# Patient Record
Sex: MALE | Race: WHITE | Employment: STUDENT | URBAN - METROPOLITAN AREA
[De-identification: names, ages, dates, MRNs, and addresses within clinical notes are randomized per-mention and may not be internally consistent; named-entity substitution may affect disease eponyms.]

---

## 2017-11-27 ENCOUNTER — OFFICE VISIT (OUTPATIENT)
Dept: FAMILY MEDICINE | Facility: CLINIC | Age: 27
End: 2017-11-27
Payer: COMMERCIAL

## 2017-11-27 VITALS
DIASTOLIC BLOOD PRESSURE: 83 MMHG | HEART RATE: 69 BPM | BODY MASS INDEX: 24.56 KG/M2 | SYSTOLIC BLOOD PRESSURE: 131 MMHG | HEIGHT: 77 IN | WEIGHT: 208 LBS | TEMPERATURE: 97.5 F

## 2017-11-27 DIAGNOSIS — Z00.00 ROUTINE HISTORY AND PHYSICAL EXAMINATION OF ADULT: Primary | ICD-10-CM

## 2017-11-27 PROCEDURE — 99395 PREV VISIT EST AGE 18-39: CPT | Performed by: FAMILY MEDICINE

## 2017-11-27 NOTE — PROGRESS NOTES
SUBJECTIVE:   CC: Asael Pena is an 27 year old male who presents for preventative health visit.     Healthy Habits:    Do you get at least three servings of calcium containing foods daily (dairy, green leafy vegetables, etc.)? 2 servings on average.    Amount of exercise or daily activities, outside of work: None    Problems taking medications regularly not applicable    Medication side effects: No    Have you had an eye exam in the past two years? Yes, Summer of 2015.    Do you see a dentist twice per year? yes    Do you have sleep apnea, excessive snoring or daytime drowsiness?no      Family Hx: none    No current outpatient prescriptions on file.    There is no problem list on file for this patient.    He has had a hemorrhoid 10 months ago. This was banded.     Chief Complaint   Patient presents with     Physical     General physical exam.     Lipids     Discuss about having labs done.       Today's PHQ-2 Score:   PHQ-2 ( 1999 Pfizer) 11/27/2017   Q1: Little interest or pleasure in doing things 0   Q2: Feeling down, depressed or hopeless 0   PHQ-2 Score 0       Abuse: Current or Past(Physical, Sexual or Emotional)- No  Do you feel safe in your environment - Yes    Social History   Substance Use Topics     Smoking status: Never Smoker     Smokeless tobacco: Never Used     Alcohol use No      Comment: None in the past 7 months.     The patient does not drink >3 drinks per day nor >7 drinks per week.    Last PSA: No results found for: PSA    Reviewed orders with patient. Reviewed health maintenance and updated orders accordingly - Yes  Reviewed and updated as needed this visit by clinical staff  Tobacco  Allergies  Meds  Med Hx  Surg Hx  Fam Hx  Soc Hx      Reviewed and updated as needed this visit by Provider    ROS:  C: NEGATIVE for fever, chills, change in weight  I: NEGATIVE for worrisome rashes, moles or lesions  E: NEGATIVE for vision changes or irritation  ENT: NEGATIVE for ear, mouth and throat  "problems  R: NEGATIVE for significant cough or SOB  CV: NEGATIVE for chest pain, palpitations or peripheral edema  GI: NEGATIVE for nausea, abdominal pain, heartburn, or change in bowel habits   male: negative for dysuria, hematuria, decreased urinary stream, erectile dysfunction, urethral discharge  M: NEGATIVE for significant arthralgias or myalgia  N: NEGATIVE for weakness, dizziness or paresthesias  P: NEGATIVE for changes in mood or affect    OBJECTIVE:   /83  Pulse 69  Temp 97.5  F (36.4  C) (Tympanic)  Ht 6' 4.5\" (1.943 m)  Wt 208 lb (94.3 kg)  BMI 24.99 kg/m2  EXAM:  GENERAL APPEARANCE: healthy, alert and no distress  EYES: EOMI,  PERRL  HENT: ear canals and TM's normal and nose and mouth without ulcers or lesions  NECK: no adenopathy, no asymmetry, masses, or scars and thyroid normal to palpation  RESP: lungs clear to auscultation - no rales, rhonchi or wheezes  CV: regular rates and rhythm, normal S1 S2, no S3 or S4 and no murmur, click or rub -  ABDOMEN:  soft, nontender, no HSM or masses and bowel sounds normal  GU_male: testicles normal without atrophy or masses, no hernias and penis normal without urethral discharge  MS: extremities normal- no gross deformities noted, no evidence of inflammation in joints, FROM in all extremities.  MS: mild bunions noted.   SKIN: no suspicious lesions or rashes  NEURO: Normal strength and tone, sensory exam grossly normal, mentation intact and speech normal  PSYCH: mentation appears normal and affect normal/bright  LYMPHATICS: No axillary, cervical, inguinal, or supraclavicular nodes      ASSESSMENT/PLAN:   1. Routine history and physical examination of adult  COUNSELING:  Reviewed preventive health counseling, as reflected in patient instructions       Regular exercise       Healthy diet/nutrition       Vision screening       Hearing screening   reports that he has never smoked. He has never used smokeless tobacco.  Estimated body mass index is 24.99 " "kg/(m^2) as calculated from the following:    Height as of this encounter: 6' 4.5\" (1.943 m).    Weight as of this encounter: 208 lb (94.3 kg).   Counseling Resources:  ATP IV Guidelines  Pooled Cohorts Equation Calculator  FRAX Risk Assessment  ICSI Preventive Guidelines  Dietary Guidelines for Americans, 2010  USDA's MyPlate  ASA Prophylaxis  Lung CA ScreeningMonitor and record the BP at rest and when healthy. The goal for the average is under 130/80.     Joon Grijalva MD  [facility]  "

## 2017-11-27 NOTE — PATIENT INSTRUCTIONS
Preventive Health Recommendations  Male Ages 18 - 25     Yearly exam:             See your health care provider every year in order to  o   Review health changes.   o   Discuss preventive care.    o   Review your medicines if your doctor has prescribed any.    You should be tested each year for STDs (sexually transmitted diseases).     Talk to your provider about cholesterol testing.      If you are at risk for diabetes, you should have a diabetes test (fasting glucose).    Shots: Get a flu shot each year. Get a tetanus shot every 10 years.     Nutrition:    Eat at least 5 servings of fruits and vegetables daily.     Eat whole-grain bread, whole-wheat pasta and brown rice instead of white grains and rice.     Talk to your provider about calcium and Vitamin D.     Lifestyle    Exercise for at least 150 minutes a week (30 minutes a day, 5 days a week). This will help you control your weight and prevent disease.     Limit alcohol to one drink per day.     No smoking.     Wear sunscreen to prevent skin cancer.     See your dentist every six months for an exam and cleaning.             Thank you for choosing [facility]  You may be receiving a survey in the mail from [employer] regarding your visit today.  Please take a few minutes to complete and return the survey to let us know how we are doing.      If you have questions or concerns, please contact us via [employer] or you can contact your care team at [phone].    Our Clinic hours are:  Monday 6:40 am  to 7:00 pm  Tuesday -Friday 6:40 am to 5:00 pm    The [place] outpatient lab hours are:  Monday - Friday 6:10 am to 4:45 pm  Saturdays 7:00 am to 11:00 am  Appointments are required, call [phone]    If you have clinical questions after hours or would like to schedule an appointment,  call the clinic at [phone].    ASSESSMENT/PLAN:   1. Routine history and physical examination of adult  COUNSELING:  Reviewed preventive health counseling, as reflected  "in patient instructions       Regular exercise       Healthy diet/nutrition       Vision screening       Hearing screening   reports that he has never smoked. He has never used smokeless tobacco.  Estimated body mass index is 24.99 kg/(m^2) as calculated from the following:    Height as of this encounter: 6' 4.5\" (1.943 m).    Weight as of this encounter: 208 lb (94.3 kg).   Counseling Resources:  ATP IV Guidelines  Pooled Cohorts Equation Calculator  FRAX Risk Assessment  ICSI Preventive Guidelines  Dietary Guidelines for Americans, 2010  USDA's MyPlate  ASA Prophylaxis  Lung CA ScreeningMonitor and record the BP at rest and when healthy. The goal for the average is under 130/80.   "

## 2017-11-27 NOTE — NURSING NOTE
"Chief Complaint   Patient presents with     Physical     General physical exam.     Lipids     Discuss about having labs done.       Initial /83  Pulse 69  Temp 97.5  F (36.4  C) (Tympanic)  Ht 6' 4.5\" (1.943 m)  Wt 208 lb (94.3 kg)  BMI 24.99 kg/m2 Estimated body mass index is 24.99 kg/(m^2) as calculated from the following:    Height as of this encounter: 6' 4.5\" (1.943 m).    Weight as of this encounter: 208 lb (94.3 kg).  Medication Reconciliation: complete  "

## 2019-01-17 ENCOUNTER — ANCILLARY PROCEDURE (OUTPATIENT)
Dept: GENERAL RADIOLOGY | Facility: CLINIC | Age: 29
End: 2019-01-17
Payer: COMMERCIAL

## 2019-01-17 ENCOUNTER — OFFICE VISIT (OUTPATIENT)
Dept: FAMILY MEDICINE | Facility: CLINIC | Age: 29
End: 2019-01-17
Payer: COMMERCIAL

## 2019-01-17 VITALS
DIASTOLIC BLOOD PRESSURE: 82 MMHG | OXYGEN SATURATION: 99 % | BODY MASS INDEX: 24.48 KG/M2 | RESPIRATION RATE: 16 BRPM | HEART RATE: 70 BPM | TEMPERATURE: 97.4 F | HEIGHT: 76 IN | WEIGHT: 201 LBS | SYSTOLIC BLOOD PRESSURE: 134 MMHG

## 2019-01-17 DIAGNOSIS — Z00.00 ROUTINE HISTORY AND PHYSICAL EXAMINATION OF ADULT: Primary | ICD-10-CM

## 2019-01-17 DIAGNOSIS — M25.531 RIGHT WRIST PAIN: ICD-10-CM

## 2019-01-17 DIAGNOSIS — M25.561 CHRONIC PAIN OF RIGHT KNEE: ICD-10-CM

## 2019-01-17 DIAGNOSIS — M79.641 PAIN OF RIGHT HAND: ICD-10-CM

## 2019-01-17 DIAGNOSIS — K64.8 INTERNAL HEMORRHOID: ICD-10-CM

## 2019-01-17 DIAGNOSIS — G89.29 CHRONIC PAIN OF RIGHT KNEE: ICD-10-CM

## 2019-01-17 PROCEDURE — 73130 X-RAY EXAM OF HAND: CPT | Mod: RT

## 2019-01-17 PROCEDURE — 99213 OFFICE O/P EST LOW 20 MIN: CPT | Mod: 25 | Performed by: FAMILY MEDICINE

## 2019-01-17 PROCEDURE — 73110 X-RAY EXAM OF WRIST: CPT | Mod: RT

## 2019-01-17 PROCEDURE — 99395 PREV VISIT EST AGE 18-39: CPT | Performed by: FAMILY MEDICINE

## 2019-01-17 RX ORDER — NYSTATIN 100000/ML
500000 SUSPENSION, ORAL (FINAL DOSE FORM) ORAL 4 TIMES DAILY
Qty: 200 ML | Refills: 0 | COMMUNITY
Start: 2019-01-17

## 2019-01-17 ASSESSMENT — MIFFLIN-ST. JEOR: SCORE: 1997.2

## 2019-01-17 NOTE — PATIENT INSTRUCTIONS
Preventive Health Recommendations  Male Ages 26 - 39    Yearly exam:             See your health care provider every year in order to  o   Review health changes.   o   Discuss preventive care.    o   Review your medicines if your doctor has prescribed any.    You should be tested each year for STDs (sexually transmitted diseases), if you re at risk.     After age 35, talk to your provider about cholesterol testing. If you are at risk for heart disease, have your cholesterol tested at least every 5 years.     If you are at risk for diabetes, you should have a diabetes test (fasting glucose).  Shots: Get a flu shot each year. Get a tetanus shot every 10 years.     Nutrition:    Eat at least 5 servings of fruits and vegetables daily.     Eat whole-grain bread, whole-wheat pasta and brown rice instead of white grains and rice.     Get adequate Calcium and Vitamin D.     Lifestyle    Exercise for at least 150 minutes a week (30 minutes a day, 5 days a week). This will help you control your weight and prevent disease.     Limit alcohol to one drink per day.     No smoking.     Wear sunscreen to prevent skin cancer.     See your dentist every six months for an exam and cleaning.             Thank you for choosing [facility].  You may be receiving a survey in the mail from [employer] regarding your visit today.  Please take a few minutes to complete and return the survey to let us know how we are doing.      If you have questions or concerns, please contact us via [employer] or you can contact your care team at [phone].    Our Clinic hours are:  Monday 6:40 am  to 7:00 pm  Tuesday -Friday 6:40 am to 5:00 pm    The [place] outpatient lab hours are:  Monday - Friday 6:10 am to 4:45 pm  Saturdays 7:00 am to 11:00 am  Appointments are required, call [phone]    If you have clinical questions after hours or would like to schedule an appointment,  call the clinic at [phone].    ASSESSMENT/PLAN:   1. Routine  "history and physical examination of adult  COUNSELING:  Reviewed preventive health counseling, as reflected in patient instructions       Regular exercise       Healthy diet/nutrition       Vision screening       Hearing screening    BP Readings from Last 1 Encounters:   01/17/19 134/82     Estimated body mass index is 24.31 kg/m  as calculated from the following:    Height as of this encounter: 1.937 m (6' 4.25\").    Weight as of this encounter: 91.2 kg (201 lb).     reports that  has never smoked. he has never used smokeless tobacco.  Counseling Resources:  ATP IV Guidelines  Pooled Cohorts Equation Calculator  FRAX Risk Assessment  ICSI Preventive Guidelines  Dietary Guidelines for Americans, 2010  [employer]'s MyPlate  ASA Prophylaxis  Lung CA Screening    2. Internal hemorrhoid  For the hemorrhoid call [phone] for the surgery appt and they may do banding in the clinic. Ronal LEDESMA   - GENERAL SURG ADULT REFERRAL    3. Right wrist pain  For the right wrist and index finger MCP joints we will do the x-rays today and call the results.   Modify activities and avoid repetitive motions with no break. Use ice for 5-10 minutes three times daily for a few days.   Advil at 400-600 mg per dose, with food up to 3 times daily could be used as needed. If not better then you see Dr. Marin for this in Ortho.   - XR Wrist Right G/E 3 Views; Future    4. Pain of right hand  See above.   - XR Hand Right G/E 3 Views; Future    5. Chronic pain of right knee  For the right knee there could be a Baker's, or ganglion cyst. Kneeling and squatting could worsen it.   If the symptoms continue call our clinic RN at [phone] and I will order the MRI without contrast.       "

## 2019-01-17 NOTE — PROGRESS NOTES
SUBJECTIVE:   CC: Asael Pena is an 28 year old male who presents for preventive health visit.     Healthy Habits:    Do you get at least three servings of calcium containing foods daily (dairy, green leafy vegetables, etc.)? yes    Amount of exercise or daily activities, outside of work: None    Problems taking medications regularly No    Medication side effects: No    Have you had an eye exam in the past two years? yes    Do you see a dentist twice per year? yes    Do you have sleep apnea, excessive snoring or daytime drowsiness?no    Chief Complaint   Patient presents with     Physical     General physical exam.       Current Outpatient Medications:      nystatin (MYCOSTATIN) 495942 UNIT/ML suspension, Take 5 mLs (500,000 Units) by mouth 4 times daily, Disp: 200 mL, Rfl: 0    There is no problem list on file for this patient.    Today's PHQ-2 Score:   PHQ-2 ( 1999 Pfizer) 1/17/2019 11/27/2017   Q1: Little interest or pleasure in doing things 0 0   Q2: Feeling down, depressed or hopeless 0 0   PHQ-2 Score 0 0       Abuse: Current or Past(Physical, Sexual or Emotional)- No  Do you feel safe in your environment? Yes    Social History     Tobacco Use     Smoking status: Never Smoker     Smokeless tobacco: Never Used   Substance Use Topics     Alcohol use: No     Comment: Occ use.     If you drink alcohol do you typically have >3 drinks per day or >7 drinks per week? No                      Last PSA: No results found for: PSA    Reviewed orders with patient. Reviewed health maintenance and updated orders accordingly - Yes    Reviewed and updated as needed this visit by clinical staff  Tobacco  Allergies  Meds  Med Hx  Surg Hx  Fam Hx  Soc Hx      Reviewed and updated as needed this visit by Provider    ROS:  CONSTITUTIONAL: NEGATIVE for fever, chills, change in weight  INTEGUMENTARY/SKIN: NEGATIVE for worrisome rashes, moles or lesions  EYES: NEGATIVE for vision changes or irritation  ENT: NEGATIVE for ear, mouth  "and throat problems  RESP: NEGATIVE for significant cough or SOB  CV: NEGATIVE for chest pain, palpitations or peripheral edema  GI: NEGATIVE for nausea, abdominal pain, heartburn, or change in bowel habits   male: negative for dysuria, hematuria, decreased urinary stream, erectile dysfunction, urethral discharge  MUSCULOSKELETAL: NEGATIVE for significant arthralgias or myalgia  NEURO: NEGATIVE for weakness, dizziness or paresthesias  PSYCHIATRIC: NEGATIVE for changes in mood or affect    He had an internal hemorrhoid that was banded in 2016. He has symptoms now with straining.     OBJECTIVE:   /82   Pulse 70   Temp 97.4  F (36.3  C) (Tympanic)   Resp 16   Ht 1.937 m (6' 4.25\")   Wt 91.2 kg (201 lb)   SpO2 99%   BMI 24.31 kg/m    EXAM:  GENERAL APPEARANCE: healthy, alert and no distress  EYES: EOMI,  PERRL  HENT: ear canals and TM's normal and nose and mouth without ulcers or lesions  NECK: no adenopathy, no asymmetry, masses, or scars and thyroid normal to palpation  RESP: lungs clear to auscultation - no rales, rhonchi or wheezes  CV: regular rates and rhythm, normal S1 S2, no S3 or S4 and no murmur, click or rub -  ABDOMEN:  soft, nontender, no HSM or masses and bowel sounds normal  GU_male: testicles normal without atrophy or masses, no hernias and penis normal without urethral discharge  MS: extremities normal- no gross deformities noted, no evidence of inflammation in joints, FROM in all extremities.  MS: bilateral bunions, more on the left.   SKIN: no suspicious lesions or rashes  NEURO: Normal strength and tone, sensory exam grossly normal, mentation intact and speech normal  PSYCH: mentation appears normal and affect normal/bright  LYMPHATICS: No axillary, cervical, inguinal, or supraclavicular nodes      ASSESSMENT/PLAN:   1. Routine history and physical examination of adult  COUNSELING:  Reviewed preventive health counseling, as reflected in patient instructions       Regular exercise       " "Healthy diet/nutrition       Vision screening       Hearing screening    BP Readings from Last 1 Encounters:   01/17/19 134/82     Estimated body mass index is 24.31 kg/m  as calculated from the following:    Height as of this encounter: 1.937 m (6' 4.25\").    Weight as of this encounter: 91.2 kg (201 lb).     reports that  has never smoked. he has never used smokeless tobacco.  Counseling Resources:  ATP IV Guidelines  Pooled Cohorts Equation Calculator  FRAX Risk Assessment  ICSI Preventive Guidelines  Dietary Guidelines for Americans, 2010  USDA's MyPlate  ASA Prophylaxis  Lung CA Screening    2. Internal hemorrhoid  For the hemorrhoid call [phone] for the surgery appt and they may do banding in the clinic. Ronal LEDESMA   - GENERAL SURG ADULT REFERRAL    3. Right wrist pain  For the right wrist and index finger MCP joints we will do the x-rays today and call the results.   Modify activities and avoid repetitive motions with no break. Use ice for 5-10 minutes three times daily for a few days.   Advil at 400-600 mg per dose, with food up to 3 times daily could be used as needed. If not better then you see Dr. Marin for this in Ortho.   - XR Wrist Right G/E 3 Views; Future    4. Pain of right hand  See above.   - XR Hand Right G/E 3 Views; Future    5. Chronic pain of right knee  For the right knee there could be a Baker's, or ganglion cyst. Kneeling and squatting could worsen it.   If the symptoms continue call our clinic RN at [phone] and I will order the MRI without contrast.       Joon Grijalva MD  [facility]  "

## 2019-02-25 ENCOUNTER — MYC MEDICAL ADVICE (OUTPATIENT)
Dept: FAMILY MEDICINE | Facility: CLINIC | Age: 29
End: 2019-02-25

## 2019-02-25 DIAGNOSIS — Q38.3 TONGUE ABNORMALITY: Primary | ICD-10-CM

## 2019-02-27 ENCOUNTER — MYC MEDICAL ADVICE (OUTPATIENT)
Dept: FAMILY MEDICINE | Facility: CLINIC | Age: 29
End: 2019-02-27

## 2019-02-27 DIAGNOSIS — Q38.3 TONGUE ABNORMALITY: Primary | ICD-10-CM

## 2019-02-27 NOTE — TELEPHONE ENCOUNTER
I make a referral to ENT for evaluation. Using an antibiotic for something unknown is not recommended. If there is an infection then he could be passing it back and forth and he has his girlfriend may need to be treated at the same time. Have him call [phone] for the ENT appt.   Thanks. Joon Grijalva

## 2019-06-10 ENCOUNTER — OFFICE VISIT (OUTPATIENT)
Dept: ORTHOPEDICS | Facility: CLINIC | Age: 29
End: 2019-06-10
Payer: COMMERCIAL

## 2019-06-10 ENCOUNTER — ANCILLARY PROCEDURE (OUTPATIENT)
Dept: GENERAL RADIOLOGY | Facility: CLINIC | Age: 29
End: 2019-06-10
Attending: PEDIATRICS
Payer: COMMERCIAL

## 2019-06-10 ENCOUNTER — TELEPHONE (OUTPATIENT)
Dept: ORTHOPEDICS | Facility: CLINIC | Age: 29
End: 2019-06-10

## 2019-06-10 VITALS
BODY MASS INDEX: 24.72 KG/M2 | HEIGHT: 76 IN | DIASTOLIC BLOOD PRESSURE: 80 MMHG | SYSTOLIC BLOOD PRESSURE: 135 MMHG | WEIGHT: 203 LBS

## 2019-06-10 DIAGNOSIS — S92.335A CLOSED NONDISPLACED FRACTURE OF THIRD METATARSAL BONE OF LEFT FOOT, INITIAL ENCOUNTER: ICD-10-CM

## 2019-06-10 DIAGNOSIS — M79.672 LEFT FOOT PAIN: ICD-10-CM

## 2019-06-10 DIAGNOSIS — M79.672 LEFT FOOT PAIN: Primary | ICD-10-CM

## 2019-06-10 PROCEDURE — 99203 OFFICE O/P NEW LOW 30 MIN: CPT | Performed by: PEDIATRICS

## 2019-06-10 PROCEDURE — 73630 X-RAY EXAM OF FOOT: CPT | Mod: LT | Performed by: PEDIATRICS

## 2019-06-10 ASSESSMENT — MIFFLIN-ST. JEOR: SCORE: 1997.3

## 2019-06-10 NOTE — PROGRESS NOTES
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    Injury: unknown, moved into new apartment one week ago, possibly dropped something on his foot. Though does not recall that.  **  No pain leading up to this time.  No pain on day of moving in. Pain 1-2 days later.  Worked 6 days this past week. Continued to have pain. Also noted swelling early last week.  Swelling forefoot dorsally.  Pain with toeing off, walking. Pain with stairs. Most of time pain with walking.    Does have hx of bunion on left, not new.    Location of Pain: left four MTP joints  Duration of Pain: 1 week(s)  Rating of Pain at worst: 6/10  Rating of Pain Currently: 5/10  Symptoms are better with: Ice, Ibuprofen, Rest, Elevation and compression  Symptoms are worse with: standing, stairs and walking.  Additional Features:   Positive: swelling   Negative: bruising, popping, grinding, catching, locking, instability, paresthesias, numbness, weakness, pain in other joints and systemic symptoms  Other evaluation and/or treatments so far consists of: Ice, Ibuprofen, Rest, Elevation and compression  Prior History of related problems: None, has a bunion on left big toe.    Social History: pharmacy tech at [employer]    Review of Systems  Musculoskeletal: as above  Remainder of review of systems is negative including constitutional, CV, pulmonary, GI, Skin and Neurologic except as noted in HPI or medical history.    **  History of right wrist fracture. Maybe ~2011. No prior stress fracture.    PMHx: bunion bilaterally. No history of stress fracture.  PSHx: noncontributory  Fam hx: noncontributory.    Social History     Socioeconomic History     Marital status: Single     Spouse name: Not on file     Number of children: Not on file     Years of education: Not on file     Highest education level: Not on file   Occupational History  "    Not on file   Social Needs     Financial resource strain: Not on file     Food insecurity:     Worry: Not on file     Inability: Not on file     Transportation needs:     Medical: Not on file     Non-medical: Not on file   Tobacco Use     Smoking status: Never Smoker     Smokeless tobacco: Never Used   Substance and Sexual Activity     Alcohol use: No     Comment: Occ use.     Drug use: Not on file     Sexual activity: Not on file   Lifestyle     Physical activity:     Days per week: Not on file     Minutes per session: Not on file     Stress: Not on file   Relationships     Social connections:     Talks on phone: Not on file     Gets together: Not on file     Attends Buddhism service: Not on file     Active member of club or organization: Not on file     Attends meetings of clubs or organizations: Not on file     Relationship status: Not on file     Intimate partner violence:     Fear of current or ex partner: Not on file     Emotionally abused: Not on file     Physically abused: Not on file     Forced sexual activity: Not on file   Other Topics Concern     Parent/sibling w/ CABG, MI or angioplasty before 65F 55M? Not Asked   Social History Narrative     Not on file       Objective  /80   Ht 1.93 m (6' 4\")   Wt 92.1 kg (203 lb)   BMI 24.71 kg/m      GENERAL APPEARANCE: healthy, alert and no distress   GAIT: mild antalgic  SKIN: no suspicious lesions or rashes  NEURO: Normal strength and tone, mentation intact and speech normal  PSYCH:  mentation appears normal and affect normal/bright  HEENT: no scleral icterus  CV: no extremity edema  RESP: nonlabored breathing    Left Foot exam    ROM:        Full active and passive ROM, ankle dorsiflexion, plantarflexion, inversion, eversion, great toe dorsiflexion, remainder of toes, midfoot and subtalar.  No change in pain with motion    Strength:   Deferred     Tender:  Dorsal forefoot    Non-Tender:       remainder of foot and ankle    Inspection:        pes " planus bilat        Narrow foot  Hallux valgus bilat    Skin:       neg (-) bruising        positive (+) swelling dorsal forefoot       well perfused       capillary refill brisk     Weightbearing:  Mild pain with WB  Pain with attempted toeing off    Sensation grossly intact to light touch  Regional pulses normal      Radiology:  Visualized radiographs of left foot obtained today, and reviewed the images with the patient.  Impression: Three views of the left foot demonstrate a linear lucency in the third metatarsal, consistent with a nondisplaced fracture. There is also some adjacent callus formation, indicating this is subacute. Otherwise, hallux valgus is noted with mild degenerative change at the first MTP joint.      Assessment:  1. Left foot pain    2. Closed nondisplaced fracture of third metatarsal bone of left foot, initial encounter        Plan:  Discussed the assessment with the patient.  Possible stress injury to start, given callus present, with pain for past 1 week; would not expect callus formation with 1 week from acute fracture.  Icing, OTC medication prn.  Activity modification reviewed.  Provided letter for work.  Discussed support options. Considerations include fracture shoe, cam walker, Dynaflex plate. Plate provided.  Follow up: ~3 weeks, repeat films foot, sooner prn  Questions answered. The patient indicates understanding of these issues and agrees with the plan.    Aaron Berumen DO, CAQ          Disclaimer: This note consists of symbols derived from keyboarding, dictation and/or voice recognition software. As a result, there may be errors in the script that have gone undetected. Please consider this when interpreting information found in this chart.

## 2019-06-10 NOTE — LETTER
[facility] [place]  [address]  [address]  [place] [address]  Phone: [phone]  Fax: [phone]      Amy 10, 2019      RE: Asael Pena  [address]  [place] [address]        To whom it may concern:    Asael Pena is under my professional care. The employee is ABLE to return to work next scheduled work date.    When the patient returns to work, advise the following until rechecked:  May need to limit time on feet for pain related to injury. Seated work, limiting walking is better at this time.      Sincerely,            Aaron BRAVO.

## 2019-06-10 NOTE — LETTER
6/10/2019         RE: Asael Pena  [address]  [facility] [address]        Dear Colleague,    Thank you for referring your patient, Asael Pena, to the [facility]. Please see a copy of my visit note below.    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    Injury: unknown, moved into new apartment one week ago, possibly dropped something on his foot. Though does not recall that.  **  No pain leading up to this time.  No pain on day of moving in. Pain 1-2 days later.  Worked 6 days this past week. Continued to have pain. Also noted swelling early last week.  Swelling forefoot dorsally.  Pain with toeing off, walking. Pain with stairs. Most of time pain with walking.    Does have hx of bunion on left, not new.    Location of Pain: left four MTP joints  Duration of Pain: 1 week(s)  Rating of Pain at worst: 6/10  Rating of Pain Currently: 5/10  Symptoms are better with: Ice, Ibuprofen, Rest, Elevation and compression  Symptoms are worse with: standing, stairs and walking.  Additional Features:   Positive: swelling   Negative: bruising, popping, grinding, catching, locking, instability, paresthesias, numbness, weakness, pain in other joints and systemic symptoms  Other evaluation and/or treatments so far consists of: Ice, Ibuprofen, Rest, Elevation and compression  Prior History of related problems: None, has a bunion on left big toe.    Social History: pharmacy tech at [employer]    Review of Systems  Musculoskeletal: as above  Remainder of review of systems is negative including constitutional, CV, pulmonary, GI, Skin and Neurologic except as noted in HPI or medical history.    **  History of right wrist fracture. Maybe ~2011. No prior stress fracture.    PMHx: bunion bilaterally. No history of stress fracture.  PSHx: noncontributory  Fam  "hx: noncontributory.    Social History     Socioeconomic History     Marital status: Single     Spouse name: Not on file     Number of children: Not on file     Years of education: Not on file     Highest education level: Not on file   Occupational History     Not on file   Social Needs     Financial resource strain: Not on file     Food insecurity:     Worry: Not on file     Inability: Not on file     Transportation needs:     Medical: Not on file     Non-medical: Not on file   Tobacco Use     Smoking status: Never Smoker     Smokeless tobacco: Never Used   Substance and Sexual Activity     Alcohol use: No     Comment: Occ use.     Drug use: Not on file     Sexual activity: Not on file   Lifestyle     Physical activity:     Days per week: Not on file     Minutes per session: Not on file     Stress: Not on file   Relationships     Social connections:     Talks on phone: Not on file     Gets together: Not on file     Attends Judaism service: Not on file     Active member of club or organization: Not on file     Attends meetings of clubs or organizations: Not on file     Relationship status: Not on file     Intimate partner violence:     Fear of current or ex partner: Not on file     Emotionally abused: Not on file     Physically abused: Not on file     Forced sexual activity: Not on file   Other Topics Concern     Parent/sibling w/ CABG, MI or angioplasty before 65F 55M? Not Asked   Social History Narrative     Not on file       Objective  /80   Ht 1.93 m (6' 4\")   Wt 92.1 kg (203 lb)   BMI 24.71 kg/m       GENERAL APPEARANCE: healthy, alert and no distress   GAIT: mild antalgic  SKIN: no suspicious lesions or rashes  NEURO: Normal strength and tone, mentation intact and speech normal  PSYCH:  mentation appears normal and affect normal/bright  HEENT: no scleral icterus  CV: no extremity edema  RESP: nonlabored breathing    Left Foot exam    ROM:        Full active and passive ROM, ankle dorsiflexion, " plantarflexion, inversion, eversion, great toe dorsiflexion, remainder of toes, midfoot and subtalar.  No change in pain with motion    Strength:   Deferred     Tender:  Dorsal forefoot    Non-Tender:       remainder of foot and ankle    Inspection:        pes planus bilat        Narrow foot  Hallux valgus bilat    Skin:       neg (-) bruising        positive (+) swelling dorsal forefoot       well perfused       capillary refill brisk     Weightbearing:  Mild pain with WB  Pain with attempted toeing off    Sensation grossly intact to light touch  Regional pulses normal      Radiology:  Visualized radiographs of left foot obtained today, and reviewed the images with the patient.  Impression: Three views of the left foot demonstrate a linear lucency in the third metatarsal, consistent with a nondisplaced fracture. There is also some adjacent callus formation, indicating this is subacute. Otherwise, hallux valgus is noted with mild degenerative change at the first MTP joint.      Assessment:  1. Left foot pain    2. Closed nondisplaced fracture of third metatarsal bone of left foot, initial encounter        Plan:  Discussed the assessment with the patient.  Possible stress injury to start, given callus present, with pain for past 1 week; would not expect callus formation with 1 week from acute fracture.  Icing, OTC medication prn.  Activity modification reviewed.  Provided letter for work.  Discussed support options. Considerations include fracture shoe, cam walker, Dynaflex plate. Plate provided.  Follow up: ~3 weeks, repeat films foot, sooner prn  Questions answered. The patient indicates understanding of these issues and agrees with the plan.    Aaron Berumen, , CAQ          Disclaimer: This note consists of symbols derived from keyboarding, dictation and/or voice recognition software. As a result, there may be errors in the script that have gone undetected. Please consider this when interpreting information  found in this chart.        Again, thank you for allowing me to participate in the care of your patient.        Sincerely,        Aaron Berumen, DO

## 2019-06-10 NOTE — TELEPHONE ENCOUNTER
Patient would like to know more information about the boot he was shown today as an option. Please call.

## 2019-06-10 NOTE — TELEPHONE ENCOUNTER
Called patient and wanted information about the short CAM boot, possibly to purchase on amazon. Gave him the information and had no further questions.    Kathrine Smith ATC

## 2020-03-11 ENCOUNTER — HEALTH MAINTENANCE LETTER (OUTPATIENT)
Age: 30
End: 2020-03-11

## 2020-05-05 ENCOUNTER — OFFICE VISIT (OUTPATIENT)
Dept: URGENT CARE | Facility: URGENT CARE | Age: 30
End: 2020-05-05
Payer: COMMERCIAL

## 2020-05-05 VITALS
BODY MASS INDEX: 26.99 KG/M2 | WEIGHT: 221.6 LBS | SYSTOLIC BLOOD PRESSURE: 133 MMHG | TEMPERATURE: 98.2 F | DIASTOLIC BLOOD PRESSURE: 89 MMHG | HEIGHT: 76 IN | OXYGEN SATURATION: 98 % | RESPIRATION RATE: 18 BRPM | HEART RATE: 95 BPM

## 2020-05-05 DIAGNOSIS — L73.9 FOLLICULITIS: Primary | ICD-10-CM

## 2020-05-05 DIAGNOSIS — H01.001 BLEPHARITIS OF RIGHT UPPER EYELID, UNSPECIFIED TYPE: ICD-10-CM

## 2020-05-05 PROCEDURE — 99214 OFFICE O/P EST MOD 30 MIN: CPT | Performed by: NURSE PRACTITIONER

## 2020-05-05 RX ORDER — BACITRACIN 500 [USP'U]/G
0.5 OINTMENT OPHTHALMIC 2 TIMES DAILY
Qty: 1 TUBE | Refills: 0 | Status: SHIPPED | OUTPATIENT
Start: 2020-05-05 | End: 2020-05-12

## 2020-05-05 RX ORDER — CEPHALEXIN 500 MG/1
500 CAPSULE ORAL 3 TIMES DAILY
Qty: 30 CAPSULE | Refills: 0 | Status: SHIPPED | OUTPATIENT
Start: 2020-05-05 | End: 2020-05-15

## 2020-05-05 ASSESSMENT — MIFFLIN-ST. JEOR: SCORE: 2076.67

## 2020-05-05 ASSESSMENT — ENCOUNTER SYMPTOMS
CHILLS: 0
SORE THROAT: 0
FEVER: 0
NAUSEA: 0
SHORTNESS OF BREATH: 0
RHINORRHEA: 0
VOMITING: 0
DIAPHORESIS: 0
COUGH: 0
DIARRHEA: 0

## 2020-05-05 ASSESSMENT — PAIN SCALES - GENERAL: PAINLEVEL: NO PAIN (0)

## 2020-05-05 NOTE — PROGRESS NOTES
SUBJECTIVE:   Asael Pena is a 30 year old male presenting with a chief complaint of   Chief Complaint   Patient presents with     Lesion     On shaft of penis, noticed it today       He is an established patient of [facility].    Swelling on the scrotum  Asael Pena is a 30-year-old male presenting to urgent care bumpy swelling on the scrotum below the penile shaft.  Around so the swelling this morning after taking a bath.  He says it is painful.  He has not used any medication.  He has never had this problem before.     Problem on the eyelid:  Location: right eyelid  Duration: 2 weeks  Current symptoms: swelling on the eyelid  Denies: pain, discharge, change in vision, change in eye color  Predisposing factors: None      Review of Systems   Constitutional: Negative for chills, diaphoresis and fever.   HENT: Negative for congestion, ear pain, rhinorrhea and sore throat.    Respiratory: Negative for cough and shortness of breath.    Gastrointestinal: Negative for diarrhea, nausea and vomiting.   Skin:        Swelling on the scrotum.   All other systems reviewed and are negative.      History reviewed. No pertinent past medical history.  Family History   Family history unknown: Yes     Current Outpatient Medications   Medication Sig Dispense Refill     bacitracin 500 UNIT/GM ophthalmic ointment Place 0.5 inches into the right eye 2 times daily for 7 days 1 Tube 0     cephALEXin (KEFLEX) 500 MG capsule Take 1 capsule (500 mg) by mouth 3 times daily for 10 days 30 capsule 0     nystatin (MYCOSTATIN) 287746 UNIT/ML suspension Take 5 mLs (500,000 Units) by mouth 4 times daily 200 mL 0     order for DME Equipment being ordered: Syncapseafixigo plate 31.5L (Patient not taking: Reported on 5/5/2020) 1 Device 0     Social History     Tobacco Use     Smoking status: Never Smoker     Smokeless tobacco: Never Used   Substance Use Topics     Alcohol use: No     Comment: Occ use.       OBJECTIVE  /89 (BP Location: Left arm, Patient  "Position: Sitting, Cuff Size: Adult Large)   Pulse 95   Temp 98.2  F (36.8  C) (Oral)   Resp 18   Ht 1.93 m (6' 4\")   Wt 100.5 kg (221 lb 9.6 oz)   SpO2 98%   BMI 26.97 kg/m      Physical Exam  Vitals signs and nursing note reviewed.   Constitutional:       General: He is not in acute distress.     Appearance: He is well-developed. He is not diaphoretic.   HENT:      Head: Normocephalic and atraumatic.      Right Ear: Tympanic membrane and external ear normal.      Left Ear: Tympanic membrane and external ear normal.   Eyes:      Pupils: Pupils are equal, round, and reactive to light.        Comments: White plug on the eyelid with posterior lid inflammation on the right upper eyelid.   Neck:      Musculoskeletal: Normal range of motion and neck supple.   Pulmonary:      Effort: Pulmonary effort is normal. No respiratory distress.      Breath sounds: Normal breath sounds.   Lymphadenopathy:      Cervical: No cervical adenopathy.   Skin:     General: Skin is warm and dry.      Comments: A pea-size erythematous swelling below the hair follicle located on the base of penis.   Neurological:      Mental Status: He is alert.      Cranial Nerves: No cranial nerve deficit.         ASSESSMENT:      ICD-10-CM    1. Folliculitis  L73.9 cephALEXin (KEFLEX) 500 MG capsule   2. Blepharitis of right upper eyelid, unspecified type  H01.001 bacitracin 500 UNIT/GM ophthalmic ointment      PLAN:  Advised to do a warm compress on the eyelid  Advised not to poke, squeeze or press on the folliculitis  Antibiotics as prescribed.    Side effects of the antibiotics discussed.   Patient educational/instructional material provided including reasons for follow-up    The patient indicates understanding of these issues and agrees with the plan.        Patient Instructions     Patient Education     Folliculitis  Folliculitis is an inflammation of a hair follicle. A hair follicle is the little pocket where a hair grows out of the skin. " Bacteria normally live on the skin. But sometimes bacteria can get trapped in a follicle and cause infection. This causes a bumpy rash. The area over the follicles is red and raised. It may itch or be painful. The bumps may have fluid (pus) inside. The pus may leak and then form crusts. Sores can spread to other areas of the body. Once it goes away, folliculitis can come back at any time. Severe cases may cause permanent hair loss and scarring.  Folliculitis can happen anywhere on the body where hair grows. It can be caused by rubbing from tight clothing. Ingrown hairs can cause it. Soaking in a hot tub or swimming pool that has bacteria in the water can cause it. It may also occur if a hair follicle is blocked by a bandage.  Sores often go away in a few days with no treatment. In some cases, medicine may be given. A small piece of skin or pus may be taken to find the type of bacteria causing the infection.  Home care  The healthcare provider may prescribe an antibiotic cream or ointment.  Oral antibiotics may also be prescribed. Or you may be told to use an over-the-counter antibiotic cream. Follow all instructions when using any of these medicines.  General care    Apply warm, moist compresses to the sores for 20 minutes up to 3 times a day. You can make a compress by soaking a cloth in warm water. Squeeze out excess water.    Don t cut, poke, or squeeze the sores. This can be painful and spread infection.    Don t scratch the affected area. Scratching can delay healing.    Don t shave the areas affected by folliculitis.    If the sores leak fluid, cover the area with a nonstick gauze bandage. Use as little tape as possible. Carefully discard all soiled bandages.    Dress in loose cotton clothing.    Change sheets and blankets if they are soiled by pus. Wash all clothes, towels, sheets, and cloth diapers in soap and hot water. Do not share clothes, towels, or sheets with other family members.    Do not soak the  sores in bath water. This can spread infection. Instead, keep the area clean by gently washing sores with soap and warm water.    Wash your hands or use antibacterial gels often to prevent spreading the bacteria.  Follow-up care  Follow up with your healthcare provider, or as advised.  When to seek medical advice  Call your healthcare provider right away if any of these occur:    Fever of 100.4 F (38 C) or higher    Spreading of the rash    Rash does not get better with treatment    Redness or swelling that gets worse    Rash becomes more painful    Foul-smelling fluid leaking from the skin    Rash improves, but then comes back   Date Last Reviewed: 11/1/2016 2000-2019 [employer] [address] [place] [address], [place] [address]. All rights reserved. This information is not intended as a substitute for professional medical care. Always follow your healthcare professional's instructions.           Patient Education     Blepharitis    Blepharitis is an inflammation of the eyelid. It results in swelling of the eyelids, and it is often caused by a bacterial infection or a skin condition. Blepharitis is a common eye condition. There are two types. Anterior blepharitis occurs where the eyelashes are attached (outside front edge of the eyelid). Posterior blepharitis affects the inner edge of the eyelid that touches the eyeball.  In addition to swollen eyelids, blepharitis symptoms can include thick, yellow, dandruff-like scales that stick to the eyelid. There may be oily patches on the eyelid. The eyelashes may be crusted (with dandruff-like scales) when you wake up from sleeping. The irritated area may itch. The eyelids may be red. The eyes can be red and burn or sting. The eyes may tear a lot, or be dry. You can become sensitive to light or have blurred vision. Symptoms of blepharitis can cause irritability.  Blepharitis is a chronic condition and hard to cure. Even with successful treatment, recurrences are  common. Good hygiene and home treatments (in the Home care section below) can improve your condition.  Causes  Causes of blepharitis may include:    Problems with the oil glands in the eyelid (meibomian glands)    Dandruff of the scalp and eyebrows (seborrheic dermatitis)    Acne rosacea (a skin condition that causes redness of the face, and other symptoms)    Eyelash mites (tiny organisms in the eyelash follicles)    Allergic reactions to cosmetics or medicines  Home care  Medicine: The healthcare provider may prescribe an antibiotic eye drops or ointment, artificial tears, and/or steroid eye drops. Follow all instructions for using these medicines. Use all medicines as directed. If you have pain, take medicine as advised by the healthcare provider.    Wash your hands carefully with soap and warm water before and after caring for your eyes.    Apply a warm compress or a warm, moist washcloth to the eyelids for 1 minute, 2 to 3 times a day, to loosen the crust. Then, wipe away scales or crust from the eyelids.    After applying the warm compress, gently scrub the base of the eyelashes for almost 15 seconds per eyelid. To do this, close your eyes and use a moist eyelid cleansing wipe, clean washcloth, or cotton swab. Ask your healthcare provider about products (such as gentle baby shampoo) to use to help clean the eyelids.    You may be instructed to gently massage your eyelids to help unblock the eyelid glands. Follow all instructions given by the healthcare provider.    Unless told otherwise, on a regular basis, with eyes closed, clean your eyelids as directed by the healthcare provider. Blepharitis can be an ongoing problem.    Don't wear eye makeup until the inflammation goes away, or as directed by your healthcare provider.    Unless told otherwise, stop using contact lenses until you complete treatment for the condition.    Wash your hands regularly to help prevent dirt and bacteria from coming in contact with  your eyelid.  Follow-up care  Follow up with your healthcare provider, or as advised. Your healthcare provider may refer you to an eye specialist (an optometrist or ophthalmologist) for further evaluation and treatment.  When to seek medical advice  Call your healthcare provider right away if any of these occur:    Increase in redness of the white part of the eye    Increase in swelling, redness, irritation, or pain of the eyelids    Eye pain    Change in vision (trouble seeing or blurring)    Drainage (pus, blood) from the eyelid    Fever of 100.4 F (38 C) or higher, or as directed by your healthcare provider  Date Last Reviewed: 3/1/2018    2158-1414 [employer]. [address] [place] [address], [place] [address]. All rights reserved. This information is not intended as a substitute for professional medical care. Always follow your healthcare professional's instructions.

## 2020-12-02 ENCOUNTER — TRANSFERRED RECORDS (OUTPATIENT)
Dept: HEALTH INFORMATION MANAGEMENT | Facility: CLINIC | Age: 30
End: 2020-12-02

## 2020-12-27 ENCOUNTER — HEALTH MAINTENANCE LETTER (OUTPATIENT)
Age: 30
End: 2020-12-27

## 2021-01-03 NOTE — MR AVS SNAPSHOT
After Visit Summary   11/27/2017    Asael Pena    MRN: [id number]           Patient Information     Date Of Birth          1990        Visit Information        Provider Department      11/27/2017 8:00 AM Joon Grijalva MD [facility]        Today's Diagnoses     Routine history and physical examination of adult    -  1      Care Instructions      Preventive Health Recommendations  Male Ages 18 - 25     Yearly exam:             See your health care provider every year in order to  o   Review health changes.   o   Discuss preventive care.    o   Review your medicines if your doctor has prescribed any.    You should be tested each year for STDs (sexually transmitted diseases).     Talk to your provider about cholesterol testing.      If you are at risk for diabetes, you should have a diabetes test (fasting glucose).    Shots: Get a flu shot each year. Get a tetanus shot every 10 years.     Nutrition:    Eat at least 5 servings of fruits and vegetables daily.     Eat whole-grain bread, whole-wheat pasta and brown rice instead of white grains and rice.     Talk to your provider about calcium and Vitamin D.     Lifestyle    Exercise for at least 150 minutes a week (30 minutes a day, 5 days a week). This will help you control your weight and prevent disease.     Limit alcohol to one drink per day.     No smoking.     Wear sunscreen to prevent skin cancer.     See your dentist every six months for an exam and cleaning.             Thank you for choosing [facility].  You may be receiving a survey in the mail from [facility] regarding your visit today.  Please take a few minutes to complete and return the survey to let us know how we are doing.      If you have questions or concerns, please contact us via [employer] or you can contact your care team at [phone].    Our Clinic hours are:  Monday 6:40 am  to 7:00 pm  Tuesday -Friday 6:40 am to 5:00 pm    The [place] outpatient lab  "hours are:  Monday - Friday 6:10 am to 4:45 pm  Saturdays 7:00 am to 11:00 am  Appointments are required, call [phone]    If you have clinical questions after hours or would like to schedule an appointment,  call the clinic at [phone].    ASSESSMENT/PLAN:   1. Routine history and physical examination of adult  COUNSELING:  Reviewed preventive health counseling, as reflected in patient instructions       Regular exercise       Healthy diet/nutrition       Vision screening       Hearing screening   reports that he has never smoked. He has never used smokeless tobacco.  Estimated body mass index is 24.99 kg/(m^2) as calculated from the following:    Height as of this encounter: 6' 4.5\" (1.943 m).    Weight as of this encounter: 208 lb (94.3 kg).   Counseling Resources:  ATP IV Guidelines  Pooled Cohorts Equation Calculator  FRAX Risk Assessment  ICSI Preventive Guidelines  Dietary Guidelines for Americans, 2010  [employer] MyPlate  ASA Prophylaxis  Lung CA ScreeningMonitor and record the BP at rest and when healthy. The goal for the average is under 130/80.           Follow-ups after your visit        Who to contact     If you have questions or need follow up information about today's clinic visit or your schedule please contact [facility] directly at [phone].  Normal or non-critical lab and imaging results will be communicated to you by [employer], letter or phone within 4 business days after the clinic has received the results. If you do not hear from us within 7 days, please contact the clinic through [employer]t or phone. If you have a critical or abnormal lab result, we will notify you by phone as soon as possible.  Submit refill requests through [employer] or call your pharmacy and they will forward the refill request to us. Please allow 3 business days for your refill to be completed.          Additional Information About Your Visit        [employer] Information     [employer] lets you send messages to " "your doctor, view your test results, renew your prescriptions, schedule appointments and more. To sign up, go to [email or web address] . Click on \"Log in\" on the left side of the screen, which will take you to the Welcome page. Then click on \"Sign up Now\" on the right side of the page.     You will be asked to enter the access code listed below, as well as some personal information. Please follow the directions to create your username and password.     Your access code is: [email or web address]  Expires: 2018  8:28 AM     Your access code will  in 90 days. If you need help or a new code, please call your [facility] clinic or [phone].        Care EveryWhere ID     This is your Care EveryWhere ID. This could be used by other organizations to access your [facility] medical records  [id number]        Your Vitals Were     Pulse Temperature Height BMI (Body Mass Index)          69 97.5  F (36.4  C) (Tympanic) 6' 4.5\" (1.943 m) 24.99 kg/m2         Blood Pressure from Last 3 Encounters:   17 131/83    Weight from Last 3 Encounters:   17 208 lb (94.3 kg)              Today, you had the following     No orders found for display       Primary Care Provider Fax #    Physician No Ref-Primary [phone]       No address on file        Equal Access to Services     OK ROCHA : Hadii pj cobiano Soavery, waaxda luqadaha, qaybta kaalmada nato, amber aguayo . So [facility] [phone].    ATENCIÓN: Si habla español, tiene a cedeño disposición servicios gratuitos de asistencia lingüística. Sophia al [phone].    We comply with applicable federal civil rights laws and [place] laws. We do not discriminate on the basis of race, color, national origin, age, disability, sex, sexual orientation, or gender identity.            Thank you!     Thank you for choosing [facility]  for your care. Our goal is always to provide you with excellent care. Hearing back from our " patients is one way we can continue to improve our services. Please take a few minutes to complete the written survey that you may receive in the mail after your visit with us. Thank you!             Your Updated Medication List - Protect others around you: Learn how to safely use, store and throw away your medicines at www.disposemymeds.org.      Notice  As of 11/27/2017  8:28 AM    You have not been prescribed any medications.       03-Jan-2021 15:39

## 2021-04-25 ENCOUNTER — HEALTH MAINTENANCE LETTER (OUTPATIENT)
Age: 31
End: 2021-04-25

## 2021-10-09 ENCOUNTER — HEALTH MAINTENANCE LETTER (OUTPATIENT)
Age: 31
End: 2021-10-09

## 2022-05-21 ENCOUNTER — HEALTH MAINTENANCE LETTER (OUTPATIENT)
Age: 32
End: 2022-05-21

## 2022-09-17 ENCOUNTER — HEALTH MAINTENANCE LETTER (OUTPATIENT)
Age: 32
End: 2022-09-17

## 2023-06-04 ENCOUNTER — HEALTH MAINTENANCE LETTER (OUTPATIENT)
Age: 33
End: 2023-06-04